# Patient Record
Sex: FEMALE | Race: ASIAN | Employment: UNEMPLOYED | URBAN - METROPOLITAN AREA
[De-identification: names, ages, dates, MRNs, and addresses within clinical notes are randomized per-mention and may not be internally consistent; named-entity substitution may affect disease eponyms.]

---

## 2017-06-04 ENCOUNTER — HOSPITAL ENCOUNTER (EMERGENCY)
Facility: HOSPITAL | Age: 77
Discharge: HOME | End: 2017-06-04
Attending: EMERGENCY MEDICINE
Payer: MEDICAID

## 2017-06-04 ENCOUNTER — APPOINTMENT (OUTPATIENT)
Dept: GENERAL RADIOLOGY | Facility: HOSPITAL | Age: 77
End: 2017-06-04
Attending: EMERGENCY MEDICINE
Payer: MEDICAID

## 2017-06-04 VITALS
WEIGHT: 125 LBS | SYSTOLIC BLOOD PRESSURE: 128 MMHG | HEART RATE: 80 BPM | OXYGEN SATURATION: 97 % | TEMPERATURE: 98 F | BODY MASS INDEX: 25.2 KG/M2 | RESPIRATION RATE: 18 BRPM | HEIGHT: 59 IN | DIASTOLIC BLOOD PRESSURE: 74 MMHG

## 2017-06-04 DIAGNOSIS — S61.211A LACERATION OF LEFT INDEX FINGER: Primary | ICD-10-CM

## 2017-06-04 PROCEDURE — 99283 EMERGENCY DEPT VISIT LOW MDM: CPT

## 2017-06-04 PROCEDURE — 73140 X-RAY EXAM OF FINGER(S): CPT | Performed by: EMERGENCY MEDICINE

## 2017-06-04 RX ORDER — CEFADROXIL 500 MG/1
500 CAPSULE ORAL 2 TIMES DAILY
Qty: 6 CAPSULE | Refills: 0 | Status: SHIPPED | OUTPATIENT
Start: 2017-06-04 | End: 2017-06-07

## 2017-06-04 NOTE — ED NOTES
Pt discharged home in good condition with family, pt family verbalizes understanding of discharge instructions

## 2017-06-04 NOTE — ED PROVIDER NOTES
Patient Seen in: [facility] Emergency Department    History   Patient presents with:  Laceration Abrasion (integumentary)    Stated Complaint: FOB and X 2 day laceration on right hand index finger    HPI    Patient is a 68-year-old lady that state PIP and DIP without difficulty. Passive range of motion is without pain. There is some minimal surrounding bruising. There is no redness or warmth or discharge.     ED Course   Labs Reviewed - No data to display    MDM       Xr Finger(s) (min 2 Views), R

## 2017-09-19 ENCOUNTER — CHARTING TRANS (OUTPATIENT)
Dept: OTHER | Age: 77
End: 2017-09-19

## 2019-11-22 ENCOUNTER — APPOINTMENT (OUTPATIENT)
Dept: CT IMAGING | Facility: HOSPITAL | Age: 79
End: 2019-11-22
Attending: EMERGENCY MEDICINE
Payer: MEDICAID

## 2019-11-22 ENCOUNTER — HOSPITAL ENCOUNTER (EMERGENCY)
Facility: HOSPITAL | Age: 79
Discharge: HOME | End: 2019-11-23
Attending: EMERGENCY MEDICINE
Payer: MEDICAID

## 2019-11-22 DIAGNOSIS — R10.9 ABDOMINAL PAIN OF UNKNOWN ETIOLOGY: Primary | ICD-10-CM

## 2019-11-22 PROCEDURE — 99284 EMERGENCY DEPT VISIT MOD MDM: CPT

## 2019-11-22 PROCEDURE — 80053 COMPREHEN METABOLIC PANEL: CPT | Performed by: EMERGENCY MEDICINE

## 2019-11-22 PROCEDURE — 85025 COMPLETE CBC W/AUTO DIFF WBC: CPT | Performed by: EMERGENCY MEDICINE

## 2019-11-22 PROCEDURE — 36415 COLL VENOUS BLD VENIPUNCTURE: CPT

## 2019-11-22 PROCEDURE — 74177 CT ABD & PELVIS W/CONTRAST: CPT | Performed by: EMERGENCY MEDICINE

## 2019-11-22 PROCEDURE — 81001 URINALYSIS AUTO W/SCOPE: CPT | Performed by: EMERGENCY MEDICINE

## 2019-11-22 PROCEDURE — 81001 URINALYSIS AUTO W/SCOPE: CPT

## 2019-11-23 VITALS
HEIGHT: 59 IN | DIASTOLIC BLOOD PRESSURE: 74 MMHG | TEMPERATURE: 98 F | WEIGHT: 130 LBS | RESPIRATION RATE: 18 BRPM | BODY MASS INDEX: 26.21 KG/M2 | SYSTOLIC BLOOD PRESSURE: 127 MMHG | HEART RATE: 64 BPM | OXYGEN SATURATION: 100 %

## 2019-11-23 NOTE — ED NOTES
PATIENT RESTING COMFORTABLY AT THIS TIME AWAITING TO GO TO CT, IN POSITION OF COMFORT.  WILL CONTINUE TO [facility]

## 2019-11-23 NOTE — ED PROVIDER NOTES
Patient Seen in: [facility] Emergency Department      History   Patient presents with:  Urinary Symptoms (urologic)    Stated Complaint: LLQ pelvic pain    HPI    79 yo female with about ten days of pelvic pain and painful urination.  Seen earlier (across the lower abdomen, left lower quadrant max tenderness). Musculoskeletal:         General: No tenderness. Right lower leg: No edema. Left lower leg: No edema. Skin:     General: Skin is warm and dry.       Capillary Refill: Capillary re lobe, too small to accurately characterize, but most likely cysts or hemangiomas. Pancreas, spleen, adrenal glands, and kidneys are unremarkable. No intestinal obstruction or acute inflammation. Nonvisualization of the appendix.   No secondary sign

## (undated) NOTE — ED AVS SNAPSHOT
[facility] Emergency Department    [address] [place] [address]    Phone:  [phone]    Fax:  [phone]           Antolin Davis   MRN: [id number]    Department:  [facility] Emergency Department   Date of Visit:  6/4/2017 It is our goal to assure that you are completely satisfied with every aspect of your visit today.   In an effort to constantly improve our service to you, we would appreciate any positive or negative feedback related to the care you received in our emergenc [employer] account. You may have had testing done that requires us to contact you. Please make sure we have your correct phone number on file.       I certified that I have received a copy of the aftercare instructions; that these instructions have been expl [employer] will allow you to access patient instructions from your recent visit,  view other health information, and more. To sign up or find more information, go to [email or web address] and click on the Sign Up Now link in the [employer] box.      Enter

## (undated) NOTE — ED AVS SNAPSHOT
Osvaldo Ahmadi   MRN: [id number]    Department:  [facility] Emergency Department   Date of Visit:  11/22/2019           Disclosure     Insurance plans vary and the physician(s) referred by the ER may not be covered by your plan.  Please contact your within the next three months to obtain basic health screening including reassessment of your blood pressure.     IF THERE IS ANY CHANGE OR WORSENING OF YOUR CONDITION, CALL YOUR PRIMARY CARE PHYSICIAN AT ONCE OR RETURN IMMEDIATELY TO THE EMERGENCY DEPARTMEN